# Patient Record
Sex: MALE | Race: WHITE | NOT HISPANIC OR LATINO | Employment: OTHER | URBAN - NONMETROPOLITAN AREA
[De-identification: names, ages, dates, MRNs, and addresses within clinical notes are randomized per-mention and may not be internally consistent; named-entity substitution may affect disease eponyms.]

---

## 2017-03-06 ENCOUNTER — HOSPITAL ENCOUNTER (OUTPATIENT)
Dept: CARDIOLOGY | Facility: HOSPITAL | Age: 77
Discharge: HOME | End: 2017-03-06
Attending: INTERNAL MEDICINE | Admitting: INTERNAL MEDICINE

## 2017-03-06 ENCOUNTER — OFFICE VISIT (OUTPATIENT)
Dept: CARDIOLOGY | Facility: CLINIC | Age: 77
End: 2017-03-06

## 2017-03-06 VITALS
OXYGEN SATURATION: 100 % | WEIGHT: 195 LBS | HEART RATE: 67 BPM | DIASTOLIC BLOOD PRESSURE: 66 MMHG | BODY MASS INDEX: 31.34 KG/M2 | HEIGHT: 66 IN | SYSTOLIC BLOOD PRESSURE: 126 MMHG

## 2017-03-06 VITALS
HEIGHT: 67 IN | SYSTOLIC BLOOD PRESSURE: 126 MMHG | WEIGHT: 190 LBS | BODY MASS INDEX: 29.82 KG/M2 | DIASTOLIC BLOOD PRESSURE: 78 MMHG

## 2017-03-06 DIAGNOSIS — I49.01 VF (VENTRICULAR FIBRILLATION) (HCC): ICD-10-CM

## 2017-03-06 DIAGNOSIS — I10 ESSENTIAL HYPERTENSION: ICD-10-CM

## 2017-03-06 DIAGNOSIS — E66.09 NON MORBID OBESITY DUE TO EXCESS CALORIES: ICD-10-CM

## 2017-03-06 DIAGNOSIS — G47.33 OBSTRUCTIVE SLEEP APNEA: ICD-10-CM

## 2017-03-06 DIAGNOSIS — I50.22 CHRONIC SYSTOLIC CONGESTIVE HEART FAILURE (HCC): Primary | ICD-10-CM

## 2017-03-06 DIAGNOSIS — Z95.2 HISTORY OF HEART VALVE TRANSPLANT: ICD-10-CM

## 2017-03-06 DIAGNOSIS — I48.0 PAROXYSMAL ATRIAL FIBRILLATION (HCC): ICD-10-CM

## 2017-03-06 DIAGNOSIS — Z95.810 ICD (IMPLANTABLE CARDIOVERTER-DEFIBRILLATOR) IN PLACE: ICD-10-CM

## 2017-03-06 DIAGNOSIS — Z95.2 H/O AORTIC VALVE REPLACEMENT: ICD-10-CM

## 2017-03-06 PROBLEM — N52.9 ERECTILE DYSFUNCTION: Status: ACTIVE | Noted: 2017-03-06

## 2017-03-06 LAB
BH CV ECHO MEAS - AO MAX PG (FULL): 12.7 MMHG
BH CV ECHO MEAS - AO MAX PG: 16 MMHG
BH CV ECHO MEAS - AO MEAN PG (FULL): 6 MMHG
BH CV ECHO MEAS - AO MEAN PG: 8 MMHG
BH CV ECHO MEAS - AO ROOT AREA (BSA CORRECTED): 1.5
BH CV ECHO MEAS - AO ROOT AREA: 6.6 CM^2
BH CV ECHO MEAS - AO ROOT DIAM: 2.9 CM
BH CV ECHO MEAS - AO V2 MAX: 200 CM/SEC
BH CV ECHO MEAS - AO V2 MEAN: 134 CM/SEC
BH CV ECHO MEAS - AO V2 VTI: 42 CM
BH CV ECHO MEAS - AVA(I,A): 1.6 CM^2
BH CV ECHO MEAS - AVA(I,D): 1.6 CM^2
BH CV ECHO MEAS - AVA(V,A): 1.7 CM^2
BH CV ECHO MEAS - AVA(V,D): 1.7 CM^2
BH CV ECHO MEAS - BSA(HAYCOCK): 2 M^2
BH CV ECHO MEAS - BSA: 2 M^2
BH CV ECHO MEAS - BZI_BMI: 29.8 KILOGRAMS/M^2
BH CV ECHO MEAS - BZI_METRIC_HEIGHT: 170.2 CM
BH CV ECHO MEAS - BZI_METRIC_WEIGHT: 86.2 KG
BH CV ECHO MEAS - CONTRAST EF 4CH: 31.5 ML/M^2
BH CV ECHO MEAS - EDV(CUBED): 341.5 ML
BH CV ECHO MEAS - EDV(MOD-SP4): 365 ML
BH CV ECHO MEAS - EDV(TEICH): 254.6 ML
BH CV ECHO MEAS - EF(CUBED): 59.5 %
BH CV ECHO MEAS - EF(MOD-SP4): 31.5 %
BH CV ECHO MEAS - EF(TEICH): 49.8 %
BH CV ECHO MEAS - ESV(CUBED): 138.2 ML
BH CV ECHO MEAS - ESV(MOD-SP4): 250 ML
BH CV ECHO MEAS - ESV(TEICH): 127.8 ML
BH CV ECHO MEAS - FS: 26 %
BH CV ECHO MEAS - IVS/LVPW: 1.3
BH CV ECHO MEAS - IVSD: 1.5 CM
BH CV ECHO MEAS - LA DIMENSION: 5.2 CM
BH CV ECHO MEAS - LA/AO: 1.8
BH CV ECHO MEAS - LAT PEAK E' VEL: 4.7 CM/SEC
BH CV ECHO MEAS - LV DIASTOLIC VOL/BSA (35-75): 184.5 ML/M^2
BH CV ECHO MEAS - LV MASS(C)D: 471.1 GRAMS
BH CV ECHO MEAS - LV MASS(C)DI: 238.1 GRAMS/M^2
BH CV ECHO MEAS - LV MAX PG: 3.3 MMHG
BH CV ECHO MEAS - LV MEAN PG: 2 MMHG
BH CV ECHO MEAS - LV SYSTOLIC VOL/BSA (12-30): 126.4 ML/M^2
BH CV ECHO MEAS - LV V1 MAX: 90.2 CM/SEC
BH CV ECHO MEAS - LV V1 MEAN: 63.7 CM/SEC
BH CV ECHO MEAS - LV V1 VTI: 18 CM
BH CV ECHO MEAS - LVIDD: 7 CM
BH CV ECHO MEAS - LVIDS: 5.2 CM
BH CV ECHO MEAS - LVLD AP4: 12.1 CM
BH CV ECHO MEAS - LVLS AP4: 11.6 CM
BH CV ECHO MEAS - LVOT AREA (M): 3.8 CM^2
BH CV ECHO MEAS - LVOT AREA: 3.8 CM^2
BH CV ECHO MEAS - LVOT DIAM: 2.2 CM
BH CV ECHO MEAS - LVPWD: 1.2 CM
BH CV ECHO MEAS - MED PEAK E' VEL: 6.53 CM/SEC
BH CV ECHO MEAS - MV A MAX VEL: 57.7 CM/SEC
BH CV ECHO MEAS - MV DEC TIME: 0.18 SEC
BH CV ECHO MEAS - MV E MAX VEL: 93.6 CM/SEC
BH CV ECHO MEAS - MV E/A: 1.6
BH CV ECHO MEAS - RAP SYSTOLE: 10 MMHG
BH CV ECHO MEAS - RVSP: 39.2 MMHG
BH CV ECHO MEAS - SI(AO): 140.2 ML/M^2
BH CV ECHO MEAS - SI(CUBED): 102.8 ML/M^2
BH CV ECHO MEAS - SI(LVOT): 34.6 ML/M^2
BH CV ECHO MEAS - SI(MOD-SP4): 58.1 ML/M^2
BH CV ECHO MEAS - SI(TEICH): 64.1 ML/M^2
BH CV ECHO MEAS - SV(AO): 277.4 ML
BH CV ECHO MEAS - SV(CUBED): 203.3 ML
BH CV ECHO MEAS - SV(LVOT): 68.4 ML
BH CV ECHO MEAS - SV(MOD-SP4): 115 ML
BH CV ECHO MEAS - SV(TEICH): 126.8 ML
BH CV ECHO MEAS - TR MAX VEL: 270 CM/SEC
E/E' RATIO: 14.3
LEFT ATRIUM VOLUME INDEX: 71.7 ML/M2
LEFT ATRIUM VOLUME: 142 CM3

## 2017-03-06 PROCEDURE — C8929 TTE W OR WO FOL WCON,DOPPLER: HCPCS

## 2017-03-06 PROCEDURE — 93306 TTE W/DOPPLER COMPLETE: CPT | Performed by: INTERNAL MEDICINE

## 2017-03-06 PROCEDURE — 25010000002 PERFLUTREN (DEFINITY) 8.476 MG IN SODIUM CHLORIDE 10 ML INJECTION: Performed by: INTERNAL MEDICINE

## 2017-03-06 PROCEDURE — 93000 ELECTROCARDIOGRAM COMPLETE: CPT | Performed by: INTERNAL MEDICINE

## 2017-03-06 PROCEDURE — 99214 OFFICE O/P EST MOD 30 MIN: CPT | Performed by: INTERNAL MEDICINE

## 2017-03-06 PROCEDURE — 0399T HC MYOCARDL STRAIN IMAG QUAN ASSMT PER SESS: CPT

## 2017-03-06 PROCEDURE — 0399T ADULT TRANSTHORACIC ECHO COMPLETE WITH CONTRAST: CPT | Performed by: INTERNAL MEDICINE

## 2017-03-06 RX ORDER — AMIODARONE HYDROCHLORIDE 400 MG/1
400 TABLET ORAL DAILY
Qty: 30 TABLET | Refills: 11 | Status: SHIPPED | OUTPATIENT
Start: 2017-03-06

## 2017-03-06 RX ADMIN — SODIUM CHLORIDE 3 ML: 9 INJECTION INTRAMUSCULAR; INTRAVENOUS; SUBCUTANEOUS at 08:36

## 2017-03-06 NOTE — PROGRESS NOTES
Reason for Visit: cardiovascular follow up.    HPI:  Ayush Carvajal is a 77 y.o. male is here today for follow-up.  He was seen in the emergency room by the cardiology service on 12/17/2016 with syncope and AICD shock that was determined to be due to VF on ICD interrogation.  His amiodarone and beta blocker were increased.  He had stem cell therapy for his COPD back in November which he feels his breathing has significant improved.  His exercise capacity is improved.  He needs some Cialis samples.  Has not had any further syncope.  He denies any palpitations, dizziness, syncope.  Has been going dancing regularly.  He saw Dr Elena earlier today.      Previous Cardiac Testing and Procedures:  - Aortic valve replacement with Bental procedure 2000, mitral valve repair?  - Echo (March 2011) EF 40%  - Echo (04/30/2012) EF 40-45%  - Echo (11/20/2012) EF 30%  - Echo (December 2013) EF 45%  - Echo (09/12/2016) EF 30-35%, mild to moderate AI  - Nuclear SPECT (12/28/2016) large fixed inferior defect consistent with scar, no evidence or reversibility to suggest ischemia, ejection fraction 27%  - Echo (03/06/2017) EF 30-35%, bioprosthetic aortic valve with mild AI and mean gradient 7 mmHg, mild MR, mild to moderate TR, RV mild to moderately dilated with normal function, left atrium moderately dilated    Patient Active Problem List   Diagnosis   • Coronary artery disease involving native coronary artery of native heart without angina pectoris   • Paroxysmal atrial fibrillation   • Essential hypertension   • Obstructive sleep apnea   • H/O aortic valve replacement   • Chronic systolic congestive heart failure   • Hypothyroidism   • Non morbid obesity   • VF (ventricular fibrillation)   • ICD (implantable cardioverter-defibrillator) in place       Social History   Substance Use Topics   • Smoking status: Former Smoker     Quit date: 2000   • Smokeless tobacco: None   • Alcohol use 4.2 oz/week     7 Shots of liquor per week       Comment: 4 oz  of vodka 2 to 3 times per week with oj        Family History   Problem Relation Age of Onset   • Coronary artery disease Other        The following portions of the patient's history were reviewed and updated as appropriate: allergies, current medications, past family history, past medical history, past social history, past surgical history and problem list.      Current Outpatient Prescriptions:   •  amiodarone (PACERONE) 200 MG tablet, Take 1 tablet by mouth Every 8 (Eight) Hours. (Patient taking differently: Take 200 mg by mouth 3 (Three) Times a Day.), Disp: 90 tablet, Rfl: 11  •  amLODIPine (NORVASC) 5 MG tablet, Take 5 mg by mouth daily., Disp: , Rfl:   •  aspirin 81 MG EC tablet, Take 81 mg by mouth daily., Disp: , Rfl:   •  carvedilol (COREG) 6.25 MG tablet, Take 6.25 mg by mouth 2 (two) times a day with meals., Disp: , Rfl:   •  cloNIDine (CATAPRES) 0.2 MG tablet, Take 0.2 mg by mouth 2 (two) times a day., Disp: , Rfl:   •  ferrous sulfate 325 (65 FE) MG tablet, Take 325 mg by mouth daily with breakfast., Disp: , Rfl:   •  furosemide (LASIX) 40 MG tablet, Take 40 mg by mouth 2 (two) times a day., Disp: , Rfl:   •  GLUTATHIONE PO, Take  by mouth., Disp: , Rfl:   •  hydrALAZINE (APRESOLINE) 50 MG tablet, Take 50 mg by mouth 3 (three) times a day., Disp: , Rfl:   •  isosorbide dinitrate (ISORDIL) 10 MG tablet, Take 10 mg by mouth 3 (three) times a day., Disp: , Rfl:   •  levocetirizine (XYZAL) 5 MG tablet, Take 5 mg by mouth every evening., Disp: , Rfl:   •  levothyroxine (SYNTHROID, LEVOTHROID) 112 MCG tablet, Take 112 mcg by mouth daily., Disp: , Rfl:   •  potassium chloride (KLOR-CON) 20 MEQ packet, Take 20 mEq by mouth 2 (two) times a day., Disp: , Rfl:   •  quinapril (ACCUPRIL) 20 MG tablet, Take 20 mg by mouth every night., Disp: , Rfl:   •  Tiotropium Bromide-Olodaterol (STIOLTO RESPIMAT IN), Inhale as needed., Disp: , Rfl:   •  ipratropium-albuterol (COMBIVENT RESPIMAT)  MCG/ACT  "inhaler, Inhale 1 puff as needed for wheezing., Disp: , Rfl:   No current facility-administered medications for this visit.     Review of Systems   Constitution: Negative for chills and fever.   Cardiovascular: Negative for chest pain and paroxysmal nocturnal dyspnea.   Respiratory: Negative for cough and shortness of breath.    Skin: Negative for rash.   Gastrointestinal: Negative for abdominal pain and heartburn.   Neurological: Negative for dizziness and numbness.       Objective   Visit Vitals   • /66 (BP Location: Left arm, Patient Position: Sitting, Cuff Size: Adult)   • Pulse 67   • Ht 66\" (167.6 cm)   • Wt 195 lb (88.5 kg)   • SpO2 100%   • BMI 31.47 kg/m2     Physical Exam   Constitutional: He is oriented to person, place, and time. He appears well-developed and well-nourished.   HENT:   Head: Normocephalic and atraumatic.   Cardiovascular: Normal rate and regular rhythm.    Murmur (Grade I systolic murmur at left sternal border. ) heard.  Pulmonary/Chest: Effort normal and breath sounds normal.   Musculoskeletal: He exhibits no edema.   Neurological: He is alert and oriented to person, place, and time.   Skin: Skin is warm and dry.   Psychiatric: He has a normal mood and affect.       ECG 12 Lead  Date/Time: 3/6/2017 2:51 PM  Performed by: CATARINO GONZALEZ  Authorized by: CATARINO GONZALEZ   Comparison: compared with previous ECG from 2016  Similar to previous ECG  Rhythm: paced  Pacin% capture                ICD-10-CM ICD-9-CM   1. Chronic systolic congestive heart failure I50.22 428.22     428.0   2. VF (ventricular fibrillation) I49.01 427.41   3. Essential hypertension I10 401.9   4. Paroxysmal atrial fibrillation I48.0 427.31   5. H/O aortic valve replacement Z95.2 V43.3   6. Obstructive sleep apnea G47.33 327.23   7. ICD (implantable cardioverter-defibrillator) in place Z95.810 V45.02   8. Non morbid obesity due to excess calories E66.09 278.00         Assessment/Plan:  1.  Chronic " systolic congestive heart failure: Known nonischemic cardiomyopathy.  Repeat echo demonstrates stable ejection fraction of 30-35%.  Currently on medical therapy with nadolol, quinapril, furosemide, hydralazine, and Isordil.  Currently euvolemic and stable.  NYHA class II.      2.  Ventricular fibrillation: Status post recent syncopal episode in December 2017 with associated ICD shock determined to be appropriate due to VF.  Will decrease amiodarone to 400 mg daily.    3.  Essential hypertension: Blood pressure remains well controlled on current medications.    4.  Paroxysmal atrial fibrillation: Remains in sinus rhythm and on amiodarone.  Followed by Dr. Landers.    5.  Bioprosthetic aortic valve: Mild aortic regurgitation with mean gradient 7 mmHg on echo from today.    6.  Obstructive sleep apnea: Managed on BiPAP.    7.  ICD in place: Status post recent appropriate ICD shock for VF.  Followed by Dr Landers.     8.  Obesity: BMI 31, continue to cousel on , exercise, weight loss.

## 2017-05-17 RX ORDER — QUINAPRIL 20 MG/1
20 TABLET ORAL NIGHTLY
Qty: 30 TABLET | Refills: 11 | Status: SHIPPED | OUTPATIENT
Start: 2017-05-17

## 2017-08-02 ENCOUNTER — TELEPHONE (OUTPATIENT)
Dept: CARDIOLOGY | Facility: CLINIC | Age: 77
End: 2017-08-02

## 2017-08-02 NOTE — TELEPHONE ENCOUNTER
Patient needs to be cleared for bone growth stimulator due to the fact that patient has pacemaker.

## 2017-08-10 NOTE — TELEPHONE ENCOUNTER
Discussed with Dr. Conley after checking with the [employer] representative.  Per [employer] and Dr. Conley's recommendation the pt should not use the bone growth stimulator that close to his device as it may cause interference.  Left a message with pt's daughter that he should not use it and to call with any questions.  Spoke to pt and advised him of the same thing.  Pt voiced understanding.

## 2017-09-06 ENCOUNTER — OFFICE VISIT (OUTPATIENT)
Dept: CARDIOLOGY | Facility: CLINIC | Age: 77
End: 2017-09-06

## 2017-09-06 VITALS
HEIGHT: 67 IN | WEIGHT: 192 LBS | RESPIRATION RATE: 18 BRPM | DIASTOLIC BLOOD PRESSURE: 75 MMHG | HEART RATE: 81 BPM | BODY MASS INDEX: 30.13 KG/M2 | SYSTOLIC BLOOD PRESSURE: 130 MMHG

## 2017-09-06 DIAGNOSIS — I10 ESSENTIAL HYPERTENSION: ICD-10-CM

## 2017-09-06 DIAGNOSIS — I49.01 VF (VENTRICULAR FIBRILLATION) (HCC): ICD-10-CM

## 2017-09-06 DIAGNOSIS — I48.0 PAROXYSMAL ATRIAL FIBRILLATION (HCC): Primary | ICD-10-CM

## 2017-09-06 DIAGNOSIS — I50.22 CHRONIC SYSTOLIC CONGESTIVE HEART FAILURE (HCC): ICD-10-CM

## 2017-09-06 DIAGNOSIS — Z95.810 ICD (IMPLANTABLE CARDIOVERTER-DEFIBRILLATOR) IN PLACE: ICD-10-CM

## 2017-09-06 PROCEDURE — 93000 ELECTROCARDIOGRAM COMPLETE: CPT | Performed by: PHYSICIAN ASSISTANT

## 2017-09-06 PROCEDURE — 99214 OFFICE O/P EST MOD 30 MIN: CPT | Performed by: PHYSICIAN ASSISTANT

## 2017-09-06 RX ORDER — LEVOTHYROXINE SODIUM 0.12 MG/1
125 TABLET ORAL DAILY
COMMUNITY
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
